# Patient Record
Sex: FEMALE | Race: WHITE | Employment: STUDENT | URBAN - METROPOLITAN AREA
[De-identification: names, ages, dates, MRNs, and addresses within clinical notes are randomized per-mention and may not be internally consistent; named-entity substitution may affect disease eponyms.]

---
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
## 2018-06-21 ENCOUNTER — APPOINTMENT (OUTPATIENT)
Dept: GENERAL RADIOLOGY | Age: 18
End: 2018-06-21
Attending: EMERGENCY MEDICINE
Payer: COMMERCIAL

## 2018-06-21 ENCOUNTER — HOSPITAL ENCOUNTER (EMERGENCY)
Age: 18
Discharge: HOME | End: 2018-06-21
Attending: EMERGENCY MEDICINE
Payer: COMMERCIAL

## 2018-06-21 VITALS
HEIGHT: 67 IN | TEMPERATURE: 98 F | HEART RATE: 72 BPM | SYSTOLIC BLOOD PRESSURE: 125 MMHG | BODY MASS INDEX: 44.57 KG/M2 | WEIGHT: 284 LBS | OXYGEN SATURATION: 98 % | RESPIRATION RATE: 16 BRPM | DIASTOLIC BLOOD PRESSURE: 58 MMHG

## 2018-06-21 DIAGNOSIS — M54.9 MID BACK PAIN: Primary | ICD-10-CM
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
## 2018-06-21 DIAGNOSIS — S93.602A SPRAIN OF LEFT FOOT, INITIAL ENCOUNTER: ICD-10-CM

## 2018-06-21 PROCEDURE — 71046 X-RAY EXAM CHEST 2 VIEWS: CPT | Performed by: EMERGENCY MEDICINE

## 2018-06-21 PROCEDURE — 99284 EMERGENCY DEPT VISIT MOD MDM: CPT

## 2018-06-21 PROCEDURE — 73630 X-RAY EXAM OF FOOT: CPT | Performed by: EMERGENCY MEDICINE

## 2018-06-21 PROCEDURE — 72072 X-RAY EXAM THORAC SPINE 3VWS: CPT | Performed by: EMERGENCY MEDICINE

## 2018-06-21 PROCEDURE — 72110 X-RAY EXAM L-2 SPINE 4/>VWS: CPT | Performed by: EMERGENCY MEDICINE

## 2018-06-21 RX ORDER — ACETAMINOPHEN 500 MG
500 TABLET ORAL ONCE
Status: COMPLETED | OUTPATIENT
Start: 2018-06-21 | End: 2018-06-21

## 2018-06-21 RX ORDER — IBUPROFEN 200 MG
200 TABLET ORAL ONCE
Status: COMPLETED | OUTPATIENT
Start: 2018-06-21 | End: 2018-06-21
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
## 2025-02-25 ENCOUNTER — HOSPITAL ENCOUNTER (EMERGENCY)
Age: 25
Discharge: HOME | End: 2025-02-25
Payer: MEDICAID

## 2025-02-25 ENCOUNTER — APPOINTMENT (OUTPATIENT)
Dept: GENERAL RADIOLOGY | Age: 25
End: 2025-02-25
Attending: EMERGENCY MEDICINE
Payer: MEDICAID

## 2025-02-25 VITALS
OXYGEN SATURATION: 100 % | DIASTOLIC BLOOD PRESSURE: 71 MMHG | SYSTOLIC BLOOD PRESSURE: 112 MMHG | RESPIRATION RATE: 18 BRPM | HEART RATE: 79 BPM | TEMPERATURE: 99 F

## 2025-02-25 DIAGNOSIS — S49.91XA INJURY OF RIGHT SHOULDER, INITIAL ENCOUNTER: Primary | ICD-10-CM

## 2025-02-25 PROCEDURE — 99284 EMERGENCY DEPT VISIT MOD MDM: CPT

## 2025-02-25 PROCEDURE — 73030 X-RAY EXAM OF SHOULDER: CPT | Performed by: EMERGENCY MEDICINE

## 2025-02-25 PROCEDURE — 99283 EMERGENCY DEPT VISIT LOW MDM: CPT

## 2025-02-25 RX ORDER — NAPROXEN 500 MG/1
500 TABLET ORAL 2 TIMES DAILY PRN
Qty: 20 TABLET | Refills: 0 | Status: SHIPPED | OUTPATIENT
Start: 2025-02-25 | End: 2025-03-04

## 2025-02-25 NOTE — ED INITIAL ASSESSMENT (HPI)
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 Patient states that she has had \"issues\" with that shoulder before, but \"I couldn't get in to see my doctor.\" Good PMS distal to injury.

## 2025-02-26 NOTE — DISCHARGE INSTRUCTIONS
Wear the sling.  Remove at night.  Perform range of motion exercises intermittently throughout the day.  Take naproxen.  Follow-up with orthopedic specialty

## 2025-02-26 NOTE — ED PROVIDER NOTES
[facility] in: [facility] In [place]      History     Chief Complaint   Patient presents with    Arm or Hand Injury     Stated Complaint: Right shoulder injury    Subjective:   HPI      24-year-old female.  Right-hand-dominant.  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  She states that \"felt like it went in and out of socket.\"  Since that time, she has continued generalized pain throughout the right shoulder.    Objective:     Past Medical History:    Autoimmune disease (HCC)              History reviewed. No pertinent surgical history.             Social History     Socioeconomic History    Marital status: Single   Tobacco Use    Smoking status: Passive Smoke Exposure - Never Smoker    Smokeless tobacco: Never   Vaping Use    Vaping status: Never Used   Substance and Sexual Activity    Alcohol use: Yes     Comment: Socially    Drug use: Never                  Physical Exam     ED Triage Vitals [02/25/25 1729]   /71   Pulse 79   Resp 18   Temp 98.6 °F (37 °C)   Temp src Temporal   SpO2 100 %   O2 Device None (Room air)       Current Vitals:   Vital Signs  BP: 112/71  Pulse: 79  Resp: 18  Temp: 98.6 °F (37 °C)  Temp src: Temporal    Oxygen Therapy  SpO2: 100 %  O2 Device: None (Room air)        Physical Exam     Gen: Well appearing, well groomed, alert and aware x 3  Neck: Supple, full range of motion  Eye examination: EOMs are intact, normal conjunctival  ENT: Atraumatic  Lung: No distress, RR, no retraction  Extremities: Generalized pain to palpation diffusely through the right shoulder without crepitus.  Maintains full active range of motion.  Strong radial pulse.  Back: Full range of motion    ED Course   Labs Reviewed - No data to display     XR SHOULDER, COMPLETE (MIN 2 VIEWS), RIGHT (CPT=73030)    Result Date: 2/25/2025  PROCEDURE:  XR SHOULDER, COMPLETE (MIN 2 VIEWS), RIGHT (CPT=73030)  TECHNIQUE:   Multiple views were obtained.  COMPARISON:  None.  INDICATIONS:  Right shoulder injury.     Pain involving the extremity, after injury.    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   FINDINGS:  Negative for fracture, dislocation, deformity or other acute bony abnormalities.  No soft tissue abnormalities.             CONCLUSION:  No acute fracture or other acute bony process.  LOCATION:  [id number]   Dictated by (CST): Jhon James MD on 2/25/2025 at 6:24 PM     Finalized by (CST): Jhon James MD on 2/25/2025 at 6:24 PM             MDM        Generalized pain to palpation diffusely through the right shoulder.  Maintains full active range of motion.  We discussed the possibility of subluxation.  Patient then explains that she has had multiple similar episodes in the past.  Considering this, we will refer to orthopedics.  She was placed in a sling.  We discussed the importance of range of motion exercises.  Naproxen.    CONCLUSION:  No acute fracture or other acute bony process.  LOCATION:  [id number]   Dictated by (CST): Jhon James MD on 2/25/2025 at 6:24 PM     Finalized by (CST): Jhon James MD on 2/25/2025 at 6:24 PM          Medical Decision Making      Disposition and Plan     Clinical Impression:  1. Injury of right shoulder, initial encounter         Disposition:  Discharge  2/25/2025  7:03 pm    Follow-up:  Apolinar Navarro PA  [address] [place] [id number]  [phone]    Follow up            Medications Prescribed:  Current Discharge Medication List        START taking these medications    Details   naproxen 500 MG Oral Tab Take 1 tablet (500 mg total) by mouth 2 (two) times daily as needed.  Qty: 20 tablet, Refills: 0                 Supplementary Documentation:

## 2025-03-29 ENCOUNTER — HOSPITAL ENCOUNTER (EMERGENCY)
Age: 25
Discharge: HOME | End: 2025-03-29
Attending: EMERGENCY MEDICINE
Payer: MEDICAID

## 2025-03-29 VITALS
RESPIRATION RATE: 16 BRPM | WEIGHT: 250 LBS | DIASTOLIC BLOOD PRESSURE: 71 MMHG | BODY MASS INDEX: 39.24 KG/M2 | HEART RATE: 80 BPM | OXYGEN SATURATION: 97 % | HEIGHT: 67 IN | TEMPERATURE: 98 F | SYSTOLIC BLOOD PRESSURE: 116 MMHG

## 2025-03-29 DIAGNOSIS — R11.2 NAUSEA AND VOMITING, UNSPECIFIED VOMITING TYPE: Primary | ICD-10-CM

## 2025-03-29 DIAGNOSIS — B34.9 VIRAL SYNDROME: ICD-10-CM

## 2025-03-29 LAB
B-HCG UR QL: NEGATIVE
POCT INFLUENZA A: NEGATIVE
POCT INFLUENZA B: NEGATIVE
SARS-COV-2 RNA RESP QL NAA+PROBE: NOT DETECTED

## 2025-03-29 PROCEDURE — 99284 EMERGENCY DEPT VISIT MOD MDM: CPT

## 2025-03-29 PROCEDURE — 87430 STREP A AG IA: CPT

## 2025-03-29 PROCEDURE — 81025 URINE PREGNANCY TEST: CPT

## 2025-03-29 PROCEDURE — 87502 INFLUENZA DNA AMP PROBE: CPT | Performed by: EMERGENCY MEDICINE

## 2025-03-29 PROCEDURE — 99283 EMERGENCY DEPT VISIT LOW MDM: CPT

## 2025-03-29 PROCEDURE — 87430 STREP A AG IA: CPT | Performed by: EMERGENCY MEDICINE

## 2025-03-29 PROCEDURE — 87502 INFLUENZA DNA AMP PROBE: CPT

## 2025-03-29 RX ORDER — ONDANSETRON 4 MG/1
4 TABLET, ORALLY DISINTEGRATING ORAL EVERY 4 HOURS PRN
Qty: 10 TABLET | Refills: 0 | Status: SHIPPED | OUTPATIENT
Start: 2025-03-29 | End: 2025-04-05

## 2025-03-30 NOTE — ED PROVIDER NOTES
Patient Seen in: [facility] In [place]      History     Chief Complaint   Patient presents with    Sore Throat     Stated Complaint: Sore throat    Subjective:   HPI      24-year-old female presents to ED with report of several episodes of vomiting Thursday night, approximately 10 times with 101 fever and states next morning she had a petechial rash on her face.  She denies any further vomiting since Thursday night.  She denies fevers, chills, abdominal pain, diarrhea, urinary symptoms at this time.  She states that her throat just started feeling sore today.  She denies cough, rhinorrhea.  She is here with her daughter who is 20 months old who is also sick with similar symptoms.  Patient states she works in a .  She reports the rash on her face is resolving.    Objective:     Past Medical History:    Autoimmune disease (HCC)              History reviewed. No pertinent surgical history.             Social History     Socioeconomic History    Marital status: Single   Tobacco Use    Smoking status: Passive Smoke Exposure - Never Smoker    Smokeless tobacco: Never   Vaping Use    Vaping status: Never Used   Substance and Sexual Activity    Alcohol use: Yes     Comment: Socially    Drug use: Never                  Physical Exam     ED Triage Vitals [03/29/25 1829]   /71   Pulse 107   Resp 16   Temp 97.7 °F (36.5 °C)   Temp src    SpO2 97 %   O2 Device None (Room air)       Current Vitals:   Vital Signs  BP: 116/71  Pulse: 80  Resp: 16  Temp: 97.7 °F (36.5 °C)    Oxygen Therapy  SpO2: 97 %  O2 Device: None (Room air)        Physical Exam  Vital signs reviewed.  Nursing note reviewed.  Constitutional: Alert, well-appearing  Head: Normocephalic, atraumatic  Mouth: Moist.  Oropharynx clear  Eyes: Extraocular muscles intact, pupils equal  Cardiovascular: Regular rate and rhythm  Pulmonary: Effort normal, breath sounds normal  Abdomen: Soft, nontender nondistended  Skin: Flushed cheeks,  tiny, faint and resolving petechiae underneath eyes infraorbital area and into temporal left forehead on the left  Musculoskeletal range of motion grossly normal all extremities  Neuro: Alert, at baseline, no focal neuro deficit.  Moves all extremities against gravity  Psych: Mood normal          ED Course     Labs Reviewed   POCT PREGNANCY URINE - Normal   RAPID SARS-COV-2 BY PCR - Normal   RAPID STREP A SCREEN (LC) - Normal    Narrative:     A confirmatory culture is recommended if clinically indicated.   POCT FLU TEST - Normal    Narrative:     This assay is a rapid molecular in vitro test utilizing nucleic acid amplification of influenza A and B viral RNA.                       MDM      Medical Decision Making:    Differential diagnosis before testing includes flu, COVID potential life threatening diagnosis which is a medical condition that poses a threat to life/function.     Comorbidities that add complexity to management: None    I reviewed prior external ED notes including dysmenorrhea    Shared decision making:    COVID, strep, flu negative.  Patient appears to have a viral syndrome, the vomiting has resolved.  The vomiting likely caused the faint and now resolving petechial rash on her face as she vomited harshly about 10 times over 30 minutes Thursday night.  Will send Zofran to her pharmacy although her vomiting has been absent from a 48 hours, she did want this.  Pregnancy negative.    Medical Decision Making      Disposition and Plan     Clinical Impression:  1. Nausea and vomiting, unspecified vomiting type    2. Viral syndrome         Disposition:  Discharge  3/29/2025  8:30 pm    Follow-up:  Мария Dacosta  [address] [place] [address]  [address]  [facility] [id number] [phone]    Follow up in 2 day(s)            Medications Prescribed:  Current Discharge Medication List        START taking these medications    Details   ondansetron 4 MG Oral Tablet Dispersible Take 1 tablet (4 mg total) by mouth  every 4 (four) hours as needed for Nausea.  Qty: 10 tablet, Refills: 0                 Supplementary Documentation:

## (undated) NOTE — ED AVS SNAPSHOT
[facility] in [address] [facility]    Phone:  [phone]    Fax:  [phone]   MRN: [id number]    Department:  [facility] in [place]   Date of Visit: IF THERE IS ANY CHANGE OR WORSENING OF YOUR CONDITION, CALL YOUR PRIMARY CARE PHYSICIAN AT ONCE OR RETURN IMMEDIATELY TO THE EMERGENCY DEPARTMENT.     If you have been prescribed any medication(s), please fill your prescription right away and begin taking t

## (undated) NOTE — ED AVS SNAPSHOT
Cheryle Carolin   MRN: [id number]    Department:  [facility] Department in [facility]   Date of Visit:  6/21/2018           Disclosure     Insurance plans vary and the physician(s) referred by the ER may not be covered by your plan.  Please contac tell this physician (or your personal doctor if your instructions are to return to your personal doctor) about any new or lasting problems. The primary care or specialist physician will see patients referred from the [facility] Emergency Department.  Mervin Cowan

## (undated) NOTE — ED AVS SNAPSHOT
THE [facility] Department in [address] [facility]    Phone:  [phone]    Fax:  [phone]   MRN: [id number]    Department:  THE [facility] Department in [place]   Date of Visit: [phone]       To Check ER Wait Times:  TEXT 'ERwait' to 38578      Click [email or web address]      Or call [phone]    If you have any problems with your follow-up, please call our  at [phone]    Sj alejandrea con I have read and understand the instructions given to me by my caregivers. 24-Hour Pharmacies        Pharmacy Address Phone Number   [address] [facility] ([address]) [address] [place] [address].  [facility] & visit,  view other health information, and more. To sign up or find more information, go to [email or web address] and click on the Sign Up Now link in the [employer] box.      Enter your Needle Activation Code exactly as it appears below along with yo